# Patient Record
Sex: MALE | URBAN - METROPOLITAN AREA
[De-identification: names, ages, dates, MRNs, and addresses within clinical notes are randomized per-mention and may not be internally consistent; named-entity substitution may affect disease eponyms.]

---

## 2021-08-09 ENCOUNTER — APPOINTMENT (RX ONLY)
Dept: URBAN - METROPOLITAN AREA CLINIC 151 | Facility: CLINIC | Age: 41
Setting detail: DERMATOLOGY
End: 2021-08-09

## 2021-08-09 DIAGNOSIS — L85.8 OTHER SPECIFIED EPIDERMAL THICKENING: ICD-10-CM

## 2021-08-09 DIAGNOSIS — B35.3 TINEA PEDIS: ICD-10-CM

## 2021-08-09 DIAGNOSIS — D22 MELANOCYTIC NEVI: ICD-10-CM

## 2021-08-09 PROBLEM — D22.4 MELANOCYTIC NEVI OF SCALP AND NECK: Status: ACTIVE | Noted: 2021-08-09

## 2021-08-09 PROCEDURE — ? PRESCRIPTION MEDICATION MANAGEMENT

## 2021-08-09 PROCEDURE — 99203 OFFICE O/P NEW LOW 30 MIN: CPT

## 2021-08-09 PROCEDURE — ? DIAGNOSIS COMMENT

## 2021-08-09 PROCEDURE — ? PRESCRIPTION

## 2021-08-09 RX ORDER — LULICONAZOLE 10 MG/G
CREAM TOPICAL
Qty: 1 | Refills: 5 | Status: ERX | COMMUNITY
Start: 2021-08-09

## 2021-08-09 RX ADMIN — LULICONAZOLE: 10 CREAM TOPICAL at 00:00

## 2021-08-09 ASSESSMENT — LOCATION SIMPLE DESCRIPTION DERM
LOCATION SIMPLE: LEFT HEEL
LOCATION SIMPLE: RIGHT HEEL
LOCATION SIMPLE: RIGHT PLANTAR SURFACE
LOCATION SIMPLE: LEFT ANTERIOR NECK
LOCATION SIMPLE: LEFT PLANTAR SURFACE

## 2021-08-09 ASSESSMENT — LOCATION DETAILED DESCRIPTION DERM
LOCATION DETAILED: RIGHT PLANTAR FOREFOOT OVERLYING 1ST METATARSAL
LOCATION DETAILED: LEFT PLANTAR FOREFOOT OVERLYING 1ST METATARSAL
LOCATION DETAILED: LEFT CLAVICULAR NECK
LOCATION DETAILED: RIGHT HEEL
LOCATION DETAILED: LEFT HEEL

## 2021-08-09 ASSESSMENT — LOCATION ZONE DERM
LOCATION ZONE: FEET
LOCATION ZONE: NECK

## 2021-08-09 NOTE — HPI: OTHER
Condition:: Mole check
Please Describe Your Condition:: Shoulder area\\nPresent about six months\\nNo pain, itch, or bleeding\\nNo family h/o MM

## 2021-08-09 NOTE — PROCEDURE: DIAGNOSIS COMMENT
Patient Management Risk Assessment: Low
Render Risk Assessment In Note?: yes
Detail Level: Zone
Comment: Recommend Eucerin roughness relief and Krazy glue for fissures

## 2021-08-20 ENCOUNTER — RX ONLY (OUTPATIENT)
Age: 41
Setting detail: RX ONLY
End: 2021-08-20

## 2021-08-20 RX ORDER — ECONAZOLE NITRATE 10 MG/G
CREAM TOPICAL
Qty: 85 | Refills: 3 | Status: ERX | COMMUNITY
Start: 2021-08-20